# Patient Record
Sex: MALE | Race: WHITE | HISPANIC OR LATINO
[De-identification: names, ages, dates, MRNs, and addresses within clinical notes are randomized per-mention and may not be internally consistent; named-entity substitution may affect disease eponyms.]

---

## 2024-01-03 ENCOUNTER — APPOINTMENT (OUTPATIENT)
Dept: ORTHOPEDIC SURGERY | Facility: CLINIC | Age: 59
End: 2024-01-03
Payer: OTHER MISCELLANEOUS

## 2024-01-03 VITALS — BODY MASS INDEX: 44.1 KG/M2 | HEIGHT: 71 IN | WEIGHT: 315 LBS

## 2024-01-03 DIAGNOSIS — Z78.9 OTHER SPECIFIED HEALTH STATUS: ICD-10-CM

## 2024-01-03 PROBLEM — Z00.00 ENCOUNTER FOR PREVENTIVE HEALTH EXAMINATION: Status: ACTIVE | Noted: 2024-01-03

## 2024-01-03 PROCEDURE — 99203 OFFICE O/P NEW LOW 30 MIN: CPT

## 2024-01-03 PROCEDURE — 73562 X-RAY EXAM OF KNEE 3: CPT | Mod: RT

## 2024-01-03 NOTE — HISTORY OF PRESENT ILLNESS
[de-identified] : Patient is here today for the right knee. Patient was injured at work on 5/12/2023  Patient was moving furniture.  He saw Dr Dupree office had MRI and pre op CSI . he had Right knee scoped 9/2023 and he states at this point the knee is about the same when compared to pre op. post op he has been going to PT and he had CSI which gave him only a few days of relief. he just finished the lubricant injections which helped minimally. he has pain mostly lateral aspect of the knee.  He has not had post op MRI.  he states due to the Right knee pain/severity and difficulty ambulating his Left knee has been painful for the past 6 weeks. He denies any prior knees issues b/l.

## 2024-01-03 NOTE — PHYSICAL EXAM
[de-identified] : Constitutional: The patient appears well developed, well nourished. Examination of patients ability to communicate functionally was normal.       Neurologic: Coordination is normal. Alert and oriented to time, place and person. No evidence of mood disorder, calm affect.       RIGHT      KNEE: Inspection of the knee is as follows: MODERATE effusion. no ecchymosis, no streaking, no erythema, no atrophy, no deformities of the quad tendon and no deformities of patellar tendon.       Palpation of the knee is as follows:LATERAL joint line tenderness and prepatellar TTP . no obvious defects, no palpable masses, no increased warmth and no crepitus.       Knee Range of Motion is as follows in degrees:       Extension: 0    Flexion: 120 with pain     Strength examination of the knee is as follows:       Quadriceps strength is 5/5   Hamstring strength is 5/5       Ligament Stability and Special Test:  positive McMurrays test. ligamentously stable, negative anterior draw, negative Lachman test, negative posterior draw and no varus or valgus instability. patella tracks well and able to do active straight leg raise without an extensor lag.       Neurological examination of the knee is as follows: light touch is intact throughout.       [Right] : right knee [FreeTextEntry9] : ap/lat/skiers show mild medial joint space narrowing and degenerative changes superior patella on lateral view.

## 2024-01-03 NOTE — DISCUSSION/SUMMARY
[de-identified] : Extensive discussion of the options was had with the patient. This discussion included both surgical and nonsurgical options. Options including but not limited to cortisone injection, viscosupplementation,  physical therapy, oral anti-inflammatories, MRI, arthroplasty and arthroscopy were discussed with the patient. We also discussed the option of observation, allowing the patient to continue rest, ice, NSAIDs and following up if the condition does not improve. Time was taken to go over any questions the patient had in regards to any of the treatment plans described.   Discussed importance of patient exploring weight management options.   Patient has tried, PT, CSI, HA inj, HEP amd scope at this time.   Patient reports he had recent blood work and all came back within normal limits.   Patient was given an prescription for a Medrol dose marcus. They were instructed to take the medication as directed and to f/u in 1 month   Patient has not wroked since May, citing that he cannot complete work activities without pain  Entered by Pete Tdod acting as scribe. Dr. Ramirez Attestation The documentation recorded by the scribe, in my presence, accurately reflects the service I, Dr. Ramirez, personally performed, and the decisions made by me with my edits as appropriate.

## 2024-01-29 ENCOUNTER — APPOINTMENT (OUTPATIENT)
Dept: ORTHOPEDIC SURGERY | Facility: CLINIC | Age: 59
End: 2024-01-29
Payer: OTHER MISCELLANEOUS

## 2024-01-29 PROCEDURE — 99213 OFFICE O/P EST LOW 20 MIN: CPT

## 2024-02-01 NOTE — PHYSICAL EXAM
[de-identified] : Constitutional: The patient appears well developed, well nourished. Examination of patients ability to communicate functionally was normal.       Neurologic: Coordination is normal. Alert and oriented to time, place and person. No evidence of mood disorder, calm affect.       RIGHT      KNEE: Inspection of the knee is as follows: MODERATE effusion. no ecchymosis, no streaking, no erythema, no atrophy, no deformities of the quad tendon and no deformities of patellar tendon.       Palpation of the knee is as follows:and prepatellar TTP . no obvious defects, no palpable masses, no increased warmth and no crepitus.       Knee Range of Motion is as follows in degrees:       Extension: 0    Flexion: 120 with pain     Strength examination of the knee is as follows:       Quadriceps strength is 5/5   Hamstring strength is 5/5       Ligament Stability and Special Test:  positive McMurrays test. ligamentously stable, negative anterior draw, negative Lachman test, negative posterior draw and no varus or valgus instability. patella tracks well and able to do active straight leg raise without an extensor lag.       Neurological examination of the knee is as follows: light touch is intact throughout.

## 2024-02-01 NOTE — DISCUSSION/SUMMARY
[de-identified] : Patient now complains of right med pain. He has had steroid and gel injections without relief. He have discussed his disability. He can do light duty but states his employer will not offer him light duty. He states he has tried PT without relief. Options were discussed. Plan is to submit to W/C for MRI authorization to evaluate for medial or lateral meniscus tear.

## 2024-02-01 NOTE — HISTORY OF PRESENT ILLNESS
[de-identified] : following up for the right knee. Patient was prescribed a Medrol pack when last seen 3 weeks ago.  he states the medrol helped a little bit.  He notes pain medial aspect of the knee. He still notes pain when he does squats with PT.  He is working with PT and is unsure if its helping. He needs assistance to get up.

## 2024-02-09 ENCOUNTER — APPOINTMENT (OUTPATIENT)
Dept: ORTHOPEDIC SURGERY | Facility: CLINIC | Age: 59
End: 2024-02-09
Payer: COMMERCIAL

## 2024-02-09 DIAGNOSIS — Z86.59 PERSONAL HISTORY OF OTHER MENTAL AND BEHAVIORAL DISORDERS: ICD-10-CM

## 2024-02-09 PROCEDURE — 73564 X-RAY EXAM KNEE 4 OR MORE: CPT | Mod: LT

## 2024-02-09 PROCEDURE — 99204 OFFICE O/P NEW MOD 45 MIN: CPT

## 2024-02-09 RX ORDER — ESCITALOPRAM OXALATE 20 MG/1
20 TABLET, FILM COATED ORAL
Refills: 0 | Status: ACTIVE | COMMUNITY

## 2024-02-09 RX ORDER — AMLODIPINE BESYLATE 5 MG/1
TABLET ORAL
Refills: 0 | Status: COMPLETED | COMMUNITY
End: 2024-02-09

## 2024-02-09 NOTE — HISTORY OF PRESENT ILLNESS
[de-identified] : Date of initial evaluation is 02/09/2024 Patient age is 58 year Body part causing symptoms is the LT knee  Occupation is out of work due to injury on the RT knee The RT knee being treated by  by Dr Ramirez Symptoms began 2 months ago when he bent down to  a box and felt a pop Location of pain is anterior and lateral Quality of pain is  dull  Pain score at rest is 1/10 Pain score during activity is 7/10 Radicular symptoms are not present Prior treatments include ibuprofen and PT at [facility] Patient's condition is not associated with workers compensation, no-fault or interscholastic athletics

## 2024-02-09 NOTE — IMAGING
[de-identified] : (Exam: Knee)   Laterality is left   Patient is in no acute distress, alert and oriented Sensation is grossly intact to light touch in the foot Motor function is 5/5 in the foot Capillary refill is less than 2 seconds in the toes Lymphadenopathy is not present Peripheral edema is not present   Skin is intact Effusion is not present Atrophy is not present Antalgic gait is not present   Medial joint line tenderness is not present Lateral joint line tenderness is present Patellar tenderness is present Calf tenderness is not present   Knee extension is 0 Knee flexion is 135   Quadriceps strength is 5/5 Hamstring strength is 5/5   Lachman is normal Posterior drawer is normal Varus stress stability is normal Valgus stress stability is normal   Medial Maddison test is normal Lateral Maddison test is abnormal Patellofemoral grind test is abnormal Patellar apprehension test is normal [Left] : left knee [All Views] : anteroposterior, lateral, skyline, and anteroposterior standing [There are no fractures, subluxations or dislocations. No significant abnormalities are seen] : There are no fractures, subluxations or dislocations. No significant abnormalities are seen [Mild patellofemoral OA] : Mild patellofemoral OA

## 2024-02-09 NOTE — DISCUSSION/SUMMARY
[de-identified] : History, clinical examination and imaging were most consistent with: -left knee lateral meniscus tear vs patellofemoral syndrome    The diagnosis was explained in detail. The potential non-surgical and surgical treatments were reviewed. The relative risks and benefits of each option were considered relative to the patients age, activity level, medical history, symptom severity and previously attempted treatments.   The patient was advised to consult with their primary medical provider prior to initiation of any new medications to reduce the risk of adverse effects specific to their long-term home medications and medical history. The risk of gastrointestinal irritation and kidney injury specific to long-term NSAID use was discussed.   -Patient will continue with PT. -Celebrex as needed for pain. -The patient has persistent symptoms despite a trial of non-surgical treatment. An MRI was therefore ordered to evaluate for structural abnormality and further guide treatment recommendations. -Work status as per Dr Ramirez for his right knee.  -Follow up when imaging completed. Will likely consider CSI before surgical options.    (MDM)   Problem Complexity -Moderate: acute, complicated injury   Risk -Moderate: prescription medication

## 2024-02-12 ENCOUNTER — RESULT REVIEW (OUTPATIENT)
Age: 59
End: 2024-02-12

## 2024-02-16 ENCOUNTER — APPOINTMENT (OUTPATIENT)
Dept: ORTHOPEDIC SURGERY | Facility: CLINIC | Age: 59
End: 2024-02-16
Payer: COMMERCIAL

## 2024-02-16 VITALS — WEIGHT: 315 LBS | BODY MASS INDEX: 44.1 KG/M2 | HEIGHT: 71 IN

## 2024-02-16 DIAGNOSIS — M22.2X2 PATELLOFEMORAL DISORDERS, LEFT KNEE: ICD-10-CM

## 2024-02-16 PROCEDURE — 99214 OFFICE O/P EST MOD 30 MIN: CPT | Mod: 25

## 2024-02-16 PROCEDURE — 20610 DRAIN/INJ JOINT/BURSA W/O US: CPT | Mod: LT

## 2024-02-16 NOTE — DATA REVIEWED
[MRI] : MRI [Left] : left [Knee] : knee [I independently reviewed and interpreted images and report] : I independently reviewed and interpreted images and report [FreeTextEntry1] : posterior horn flap tear medial meniscus, root intact, lateral meniscus intact, severe PF OA of the medial facet

## 2024-02-16 NOTE — IMAGING
[Left] : left knee [All Views] : anteroposterior, lateral, skyline, and anteroposterior standing [There are no fractures, subluxations or dislocations. No significant abnormalities are seen] : There are no fractures, subluxations or dislocations. No significant abnormalities are seen [Mild patellofemoral OA] : Mild patellofemoral OA [de-identified] : (Exam: Knee)   Laterality is left   Patient is in no acute distress, alert and oriented Sensation is grossly intact to light touch in the foot Motor function is 5/5 in the foot Capillary refill is less than 2 seconds in the toes Lymphadenopathy is not present Peripheral edema is not present   Skin is intact Effusion is not present Atrophy is not present Antalgic gait is not present   Medial joint line tenderness is present Lateral joint line tenderness is not present Patellar tenderness is present Calf tenderness is not present   Knee extension is 0 Knee flexion is 135   Quadriceps strength is 5/5 Hamstring strength is 5/5   Lachman is normal Posterior drawer is normal Varus stress stability is normal Valgus stress stability is normal   Medial Maddison test is abnormal Lateral Maddison test is normal Patellofemoral grind test is abnormal Patellar apprehension test is normal

## 2024-02-16 NOTE — HISTORY OF PRESENT ILLNESS
[de-identified] : 02/16/2024 LT knee MRI review. pain is mostly anterior worse with stairs  IMPRESSION: * Medial meniscal tear with flipped meniscal fragment. * Osteoarthritis as described. * Small knee joint effusion.   Date of initial evaluation is 02/09/2024 Patient age is 58 year Body part causing symptoms is the LT knee  Occupation is out of work due to injury on the RT knee The RT knee being treated by  by Dr Ramirez Symptoms began 2 months ago when he bent down to  a box and felt a pop Location of pain is anterior and lateral Quality of pain is  dull  Pain score at rest is 1/10 Pain score during activity is 7/10 Radicular symptoms are not present Prior treatments include ibuprofen and PT at [facility] Patient's condition is not associated with workers compensation, no-fault or interscholastic athletics

## 2024-02-16 NOTE — DISCUSSION/SUMMARY
[de-identified] : History, clinical examination and imaging were most consistent with: -left knee medial meniscus tear and patellofemoral osteoarthritis    The diagnosis was explained in detail. The potential non-surgical and surgical treatments were reviewed. The relative risks and benefits of each option were considered relative to the patients age, activity level, medical history, symptom severity and previously attempted treatments.   The patient was advised to consult with their primary medical provider prior to initiation of any new medications to reduce the risk of adverse effects specific to their long-term home medications and medical history. The risk of gastrointestinal irritation and kidney injury specific to long-term NSAID use was discussed.   -Discussed arthroscopic meniscectomy versus a trial of non-surgical treatment. Discussed meniscectomy would not alleviate pain from his patellofemoral joint. -Patient wishes to attempt a trial of non-surgical treatment. -Patient will continue with PT. -Cortisone injection performed for symptom relief.  -Celebrex as needed for pain. -Work status as per Dr Ramirez for his right knee.  -Follow up in 6 weeks, if symptoms persist will discuss meniscectomy.    (MDM)   Problem Complexity -Moderate: acute, complicated injury   Risk -Moderate: injection  (Procedure: Corticosteroid Injection)   Injection location was the: -left knee joint   Injection contents were: 40 mg of kenalog and 5 mL of 1% lidocaine   Procedure Details: -Informed consent was obtained. Discussed possible risks of corticosteroid injection including hyperglycemia, infection and skin discoloration. -Discussed that due to infection risk, an interval between injection and any future surgery is 6 weeks for arthroscopy and 3 months for arthroplasty. -Injection performed using aseptic technique. Hemostasis was confirmed. -Procedure was tolerated well with no complications.

## 2024-03-04 ENCOUNTER — APPOINTMENT (OUTPATIENT)
Dept: ORTHOPEDIC SURGERY | Facility: CLINIC | Age: 59
End: 2024-03-04
Payer: COMMERCIAL

## 2024-03-04 PROCEDURE — 99214 OFFICE O/P EST MOD 30 MIN: CPT

## 2024-03-04 NOTE — IMAGING
[Left] : left knee [There are no fractures, subluxations or dislocations. No significant abnormalities are seen] : There are no fractures, subluxations or dislocations. No significant abnormalities are seen [All Views] : anteroposterior, lateral, skyline, and anteroposterior standing [Mild patellofemoral OA] : Mild patellofemoral OA [de-identified] : (Exam: Knee)   Laterality is left   Patient is in no acute distress, alert and oriented Sensation is grossly intact to light touch in the foot Motor function is 5/5 in the foot Capillary refill is less than 2 seconds in the toes Lymphadenopathy is not present Peripheral edema is not present   Skin is intact Effusion is not present Atrophy is not present Antalgic gait is not present   Medial joint line tenderness is present Lateral joint line tenderness is not present Patellar tenderness is present Calf tenderness is not present   Knee extension is 0 Knee flexion is 135   Quadriceps strength is 5/5 Hamstring strength is 5/5   Lachman is normal Posterior drawer is normal Varus stress stability is normal Valgus stress stability is normal   Medial Maddison test is abnormal Lateral Maddison test is normal Patellofemoral grind test is abnormal Patellar apprehension test is normal

## 2024-03-04 NOTE — HISTORY OF PRESENT ILLNESS
[de-identified] : 03/04/2024: f/u for left knee. CSI on 02/16/2024 with relief for 3 days. Stopped PT due to pain, has been trying to do home exercises. Stopped taking Celebrex due to no relief. Feels worse than last visit. has pain and swelling.   02/16/2024 LT knee MRI review. pain is mostly anterior worse with stairs  IMPRESSION: * Medial meniscal tear with flipped meniscal fragment. * Osteoarthritis as described. * Small knee joint effusion.   Date of initial evaluation is 02/09/2024 Patient age is 58 year Body part causing symptoms is the LT knee  Occupation is out of work due to injury on the RT knee The RT knee being treated by  by Dr Ramirez Symptoms began 2 months ago when he bent down to  a box and felt a pop Location of pain is anterior and lateral Quality of pain is  dull  Pain score at rest is 1/10 Pain score during activity is 7/10 Radicular symptoms are not present Prior treatments include ibuprofen and PT at [facility] Patient's condition is not associated with workers compensation, no-fault or interscholastic athletics

## 2024-03-04 NOTE — DISCUSSION/SUMMARY
[de-identified] : History, clinical examination and imaging were most consistent with: -left knee medial meniscus tear and patellofemoral osteoarthritis    The diagnosis was explained in detail. The potential non-surgical and surgical treatments were reviewed. The relative risks and benefits of each option were considered relative to the patients age, activity level, medical history, symptom severity and previously attempted treatments.   -Surgical treatment was selected. The patient failed to improve with non-surgical treatment. Their symptoms have placed a significant burden on their quality of life. The risks, benefits and alternatives of the planned surgical procedure were discussed, along with the expected timeline for rehabilitation and specifics regarding the most common complications. The patient agreed to participate in post-operative physical therapy. -Work status as per Dr Ramirez for his right knee.  -Discussed that arthroscopic surgery may not predictably improve pain from his patellofemoral degeneration. -Patient provided medrol dose pack for interim symptom relief.   -Procedure: left knee arthroscopy, medial meniscectomy -Clearances: standard medical -Diabetic: no -Smoker: no -Follow up: 1-2 weeks after surgery   (MDM)   Problem Complexity -Moderate: acute, complicated injury   Risk -Moderate: pre-surgical discussion

## 2024-03-13 ENCOUNTER — NON-APPOINTMENT (OUTPATIENT)
Age: 59
End: 2024-03-13

## 2024-03-29 ENCOUNTER — APPOINTMENT (OUTPATIENT)
Dept: ORTHOPEDIC SURGERY | Facility: CLINIC | Age: 59
End: 2024-03-29
Payer: COMMERCIAL

## 2024-03-29 DIAGNOSIS — M17.12 UNILATERAL PRIMARY OSTEOARTHRITIS, LEFT KNEE: ICD-10-CM

## 2024-03-29 DIAGNOSIS — S83.242A OTHER TEAR OF MEDIAL MENISCUS, CURRENT INJURY, LEFT KNEE, INITIAL ENCOUNTER: ICD-10-CM

## 2024-03-29 PROCEDURE — 99214 OFFICE O/P EST MOD 30 MIN: CPT

## 2024-03-29 NOTE — DISCUSSION/SUMMARY
[de-identified] : History, clinical examination and imaging were most consistent with: -left knee medial meniscus tear and patellofemoral osteoarthritis    The diagnosis was explained in detail. The potential non-surgical and surgical treatments were reviewed. The relative risks and benefits of each option were considered relative to the patients age, activity level, medical history, symptom severity and previously attempted treatments.   -Patient will proceed with surgery as scheduled with left knee medial meniscectomy.  -Work status as per Dr Ramirez for his right knee.  -Discussed that arthroscopic surgery may not predictably improve pain from his patellofemoral degeneration. -Follow up post-operatively.    (MDM)   Problem Complexity -Moderate: acute, complicated injury   Risk -Moderate: pre-surgical discussion

## 2024-03-29 NOTE — IMAGING
[Left] : left knee [All Views] : anteroposterior, lateral, skyline, and anteroposterior standing [There are no fractures, subluxations or dislocations. No significant abnormalities are seen] : There are no fractures, subluxations or dislocations. No significant abnormalities are seen [Mild patellofemoral OA] : Mild patellofemoral OA [de-identified] : (Exam: Knee)   Laterality is left   Patient is in no acute distress, alert and oriented Sensation is grossly intact to light touch in the foot Motor function is 5/5 in the foot Capillary refill is less than 2 seconds in the toes Lymphadenopathy is not present Peripheral edema is not present   Skin is intact Effusion is not present Atrophy is not present Antalgic gait is not present   Medial joint line tenderness is present Lateral joint line tenderness is not present Patellar tenderness is present Calf tenderness is not present   Knee extension is 0 Knee flexion is 135   Quadriceps strength is 5/5 Hamstring strength is 5/5   Lachman is normal Posterior drawer is normal Varus stress stability is normal Valgus stress stability is normal   Medial Maddison test is abnormal Lateral Maddison test is normal Patellofemoral grind test is abnormal Patellar apprehension test is normal

## 2024-04-02 RX ORDER — ASPIRIN 81 MG/1
81 TABLET, COATED ORAL
Qty: 60 | Refills: 0 | Status: ACTIVE | COMMUNITY
Start: 2024-04-02 | End: 1900-01-01

## 2024-04-03 ENCOUNTER — APPOINTMENT (OUTPATIENT)
Dept: ORTHOPEDIC SURGERY | Facility: HOSPITAL | Age: 59
End: 2024-04-03
Payer: COMMERCIAL

## 2024-04-03 PROCEDURE — 29881 ARTHRS KNE SRG MNISECTMY M/L: CPT | Mod: LT

## 2024-04-19 ENCOUNTER — APPOINTMENT (OUTPATIENT)
Dept: ORTHOPEDIC SURGERY | Facility: CLINIC | Age: 59
End: 2024-04-19
Payer: COMMERCIAL

## 2024-04-19 PROCEDURE — 99024 POSTOP FOLLOW-UP VISIT: CPT

## 2024-05-01 ENCOUNTER — APPOINTMENT (OUTPATIENT)
Dept: ORTHOPEDIC SURGERY | Facility: CLINIC | Age: 59
End: 2024-05-01

## 2024-05-17 ENCOUNTER — APPOINTMENT (OUTPATIENT)
Dept: ORTHOPEDIC SURGERY | Facility: CLINIC | Age: 59
End: 2024-05-17
Payer: COMMERCIAL

## 2024-05-17 DIAGNOSIS — Z47.89 ENCOUNTER FOR OTHER ORTHOPEDIC AFTERCARE: ICD-10-CM

## 2024-05-17 PROCEDURE — 99024 POSTOP FOLLOW-UP VISIT: CPT

## 2024-05-17 NOTE — DISCUSSION/SUMMARY
[de-identified] : Patient is making progress with function and strength.  -Continue with PT and transition to HEP. -Return to activity as symptoms permit.  -Ibuprofen and acetaminophen as needed for pain. -Follow up as needed.

## 2024-05-17 NOTE — HISTORY OF PRESENT ILLNESS
[de-identified] : 05/17/2024: f/u for left knee. PT 2x a week with improvement. still has clicking and pain when moving from sitting to standing. ibuprofen PRN. reports worsening back pain and has upcoming visit with a specialist.   Eval is 4/19 Date of surgery was 04/03/24  Surgery performed was LT knee medial meniscectomy Current pain score is 5/10 Current medications taken are Motrin, Ibuprofen  Physical therapy is not started yet

## 2024-05-17 NOTE — IMAGING
[de-identified] : Laterality is left   Patient is in no acute distress, alert and oriented Sensation is grossly intact to light touch in the foot Motor function is 5/5 in the foot Capillary refill is less than 2 seconds in the toes Lymphadenopathy is not present Peripheral edema is not present   Skin is intact Incisions are healing well Effusion is trace Quadriceps atrophy is present Calf tenderness is not present   Knee extension is 0 Knee flexion is 135   Quadriceps strength is 5-/5 Hamstring strength is 5/5   Lachman is normal Posterior drawer is normal Varus stress stability is normal Valgus stress stability is normal

## 2024-05-22 ENCOUNTER — APPOINTMENT (OUTPATIENT)
Dept: ORTHOPEDIC SURGERY | Facility: CLINIC | Age: 59
End: 2024-05-22
Payer: OTHER MISCELLANEOUS

## 2024-05-22 VITALS — WEIGHT: 314 LBS | HEIGHT: 71 IN | BODY MASS INDEX: 43.96 KG/M2

## 2024-05-22 DIAGNOSIS — M17.11 UNILATERAL PRIMARY OSTEOARTHRITIS, RIGHT KNEE: ICD-10-CM

## 2024-05-22 DIAGNOSIS — M23.91 UNSPECIFIED INTERNAL DERANGEMENT OF RIGHT KNEE: ICD-10-CM

## 2024-05-22 PROCEDURE — 99214 OFFICE O/P EST MOD 30 MIN: CPT

## 2024-05-22 NOTE — PHYSICAL EXAM
[de-identified] : Constitutional: The patient appears well developed, well nourished. Examination of patients ability to communicate functionally was normal.       Neurologic: Coordination is normal. Alert and oriented to time, place and person. No evidence of mood disorder, calm affect.       RIGHT      KNEE: Inspection of the knee is as follows: MODERATE effusion. no ecchymosis, no streaking, no erythema, no atrophy, no deformities of the quad tendon and no deformities of patellar tendon.       Palpation of the knee is as follows:and prepatellar TTP . no obvious defects, no palpable masses, no increased warmth and no crepitus.       Knee Range of Motion is as follows in degrees:       Extension: 0    Flexion: 120 with pain     Strength examination of the knee is as follows:       Quadriceps strength is 5/5   Hamstring strength is 5/5       Ligament Stability and Special Test:  positive McMurrays test. ligamentously stable, negative anterior draw, negative Lachman test, negative posterior draw and no varus or valgus instability. patella tracks well and able to do active straight leg raise without an extensor lag.       Neurological examination of the knee is as follows: light touch is intact throughout.

## 2024-05-22 NOTE — DISCUSSION/SUMMARY
[de-identified] : Extensive discussion of the options was had with the patient. This discussion included both surgical and nonsurgical options. Options including but not limited to cortisone injection, visco-supplementation, physical therapy, oral anti-inflammatories, MRI, arthroplasty VS arthroscopy were discussed with the patient. We also discussed the option of observation, allowing the patient to continue rest, ice, prescription drug NSAIDs and following up if the condition does not improve. Time was taken to go over any questions the patient had in regard to any of the treatment plans described. I have personally reviewed all previous images as well as imaging reports and the information was thoroughly discussed and reviewed with the patient as well. All questions have been answered and the patient is in agreement with the plan proceeding. The Risks, benefits, alternatives and expectations of the proposed procedure, as well as non-operative management, were discussed at length with the patient, as well as the procedure itself and the expected recovery period. The possible need for post operative Physical Therapy was also discussed. The patient was allowed as much time as necessary to ask all appropriate questions and they were answered to the patient's satisfaction.  The plan at this time is to go forward with RIGHT KNEE MEDIAL MENISCECTOMY  Entered by Pete Todd acting as scribe. Dr. Ramirez Attestation The documentation recorded by the scribe, in my presence, accurately reflects the service I, Dr. Ramirez, personally performed, and the decisions made by me with my edits as appropriate.

## 2024-05-22 NOTE — HISTORY OF PRESENT ILLNESS
[de-identified] : Following up on right knee. Patient is here to review MRI results.  Patient admits he still notes pain medial aspect of the knee but it has improved.

## 2024-06-05 ENCOUNTER — APPOINTMENT (OUTPATIENT)
Dept: ORTHOPEDIC SURGERY | Facility: CLINIC | Age: 59
End: 2024-06-05
Payer: COMMERCIAL

## 2024-06-05 DIAGNOSIS — M48.061 SPINAL STENOSIS, LUMBAR REGION WITHOUT NEUROGENIC CLAUDICATION: ICD-10-CM

## 2024-06-05 DIAGNOSIS — M51.36 OTHER INTERVERTEBRAL DISC DEGENERATION, LUMBAR REGION: ICD-10-CM

## 2024-06-05 DIAGNOSIS — M43.16 SPONDYLOLISTHESIS, LUMBAR REGION: ICD-10-CM

## 2024-06-05 DIAGNOSIS — M51.37 OTHER INTERVERTEBRAL DISC DEGENERATION, LUMBOSACRAL REGION: ICD-10-CM

## 2024-06-05 DIAGNOSIS — M47.817 SPONDYLOSIS W/OUT MYELOPATHY OR RADICULOPATHY, LUMBOSACRAL REGION: ICD-10-CM

## 2024-06-05 PROCEDURE — 99214 OFFICE O/P EST MOD 30 MIN: CPT | Mod: 24

## 2024-06-05 PROCEDURE — 72100 X-RAY EXAM L-S SPINE 2/3 VWS: CPT

## 2024-06-05 RX ORDER — METHYLPREDNISOLONE 4 MG/1
4 TABLET ORAL
Qty: 1 | Refills: 0 | Status: ACTIVE | COMMUNITY
Start: 2024-06-05 | End: 1900-01-01

## 2024-06-05 RX ORDER — OXYCODONE 5 MG/1
5 TABLET ORAL EVERY 6 HOURS
Qty: 20 | Refills: 0 | Status: DISCONTINUED | COMMUNITY
Start: 2024-04-02 | End: 2024-06-05

## 2024-06-05 RX ORDER — POLYETHYLENE GLYCOL 3350 17 G/17G
17 POWDER, FOR SOLUTION ORAL DAILY
Qty: 7 | Refills: 0 | Status: DISCONTINUED | COMMUNITY
Start: 2024-04-02 | End: 2024-06-05

## 2024-06-05 RX ORDER — CELECOXIB 200 MG/1
200 CAPSULE ORAL
Qty: 60 | Refills: 2 | Status: DISCONTINUED | COMMUNITY
Start: 2024-02-09 | End: 2024-06-05

## 2024-06-05 RX ORDER — FAMOTIDINE 20 MG/1
20 TABLET, FILM COATED ORAL
Qty: 14 | Refills: 0 | Status: DISCONTINUED | COMMUNITY
Start: 2024-04-02 | End: 2024-06-05

## 2024-06-05 RX ORDER — METHOCARBAMOL 750 MG/1
750 TABLET, FILM COATED ORAL
Qty: 60 | Refills: 0 | Status: ACTIVE | COMMUNITY
Start: 2024-06-05 | End: 1900-01-01

## 2024-06-05 RX ORDER — METHYLPREDNISOLONE 4 MG/1
4 TABLET ORAL
Qty: 1 | Refills: 0 | Status: DISCONTINUED | COMMUNITY
Start: 2024-03-04 | End: 2024-06-05

## 2024-06-05 RX ORDER — MELOXICAM 15 MG/1
15 TABLET ORAL DAILY
Qty: 30 | Refills: 2 | Status: DISCONTINUED | COMMUNITY
Start: 2024-04-02 | End: 2024-06-05

## 2024-06-05 RX ORDER — METHYLPREDNISOLONE 4 MG/1
4 TABLET ORAL
Qty: 1 | Refills: 0 | Status: DISCONTINUED | COMMUNITY
Start: 2024-01-03 | End: 2024-06-05

## 2024-06-05 NOTE — PHYSICAL EXAM
[de-identified] : Constitutional: Well groomed and developed.  Respiratory: Normal, unlabored breathing. No use of accessory muscles.  Skin: No rashes or ulcers. Skin warm and dry.  Psychiatric: Oriented to time, place, person and event. No acute distress. Gait: Heel to toe Patient able to walk on toes and heels.    Lumbar spine:  Posture: Normal on coronal and sagittal alignment  AROM:  Flexion 60  Extension 5 Moderate pain with simulated truncal motion   Tenderness:  Thoracic: No tenderness on palpation  Lumbar: Moderate tenderness on palpation  Sacrum/coccyx: no tenderness on palpation  Greater trochanteric bursa:  No tenderness  PSIS: None    Motor:                         R             L LE:                                IS                    5             5 Quad              5             5 TA                  5             5 EHL                5             5 Gastroc         5             5                 R  L DTR: Patella  2+  2+ Achilles  2+  2+  Sensory: Light touch sensation decreased right L4 Babinski's Sign: Negative bilaterally  Straight leg raise test: Positive bilaterally  REGGIE test: negative bilaterally

## 2024-06-05 NOTE — HISTORY OF PRESENT ILLNESS
[Lower back] : lower back [Gradual] : gradual [9] : 9 [6] : 6 [Constant] : constant [de-identified] : Patient presents today with lower back pain ongoing for years- worse ~12/2023 after having meniscus sx in bilat knees.  Pt reports constant pain in middle and left side of lower back Reports intermittent pain radiating into leg- denies numbness or tingling in LEs Reports pain is worse when laying down and with prolonged walking  Reports using ice and heat with some relief  Denies taking meds for pain  Pt reports going to [facility] in [place] 2x/week with some relief.  Pt denies imaging of the back.  [] : no [de-identified] : not working

## 2024-06-05 NOTE — ASSESSMENT
[FreeTextEntry1] : 60 yo male presents today for eval of his low back. XR shows DDD L4-S1 and spondy L3-4. Options discussed. I recommend proceeding with PT and medication management for relief. May consider MRI followed by AL[facility]HA in future if no relief.   - Recommend physical therapy to regain range of motion, strengthening and symptomatic improvement. Prescription given in office today.   - Patient will begin Medrol.  Patient advised not to take any NSAIDs while taking the steroids.   - Patient given prescription for Robaxin 750mg today. Advised patient that medication may make them drowsy, start by taking it at night.   - Recommend NSAIDs PRN - Recommend heating pad use to decrease muscle spasm - Discussed the importance of home exercises, including but not limited to hamstring stretching and core strengthening   Patient was educated on their diagnosis today. All questions answered and patient expressed understanding.  Follow up in 2 months

## 2024-07-01 ENCOUNTER — NON-APPOINTMENT (OUTPATIENT)
Age: 59
End: 2024-07-01

## 2024-08-01 RX ORDER — HYDROCODONE BITARTRATE AND ACETAMINOPHEN 5; 325 MG/1; MG/1
5-325 TABLET ORAL
Qty: 30 | Refills: 0 | Status: ACTIVE | COMMUNITY
Start: 2024-08-01 | End: 1900-01-01

## 2024-08-02 ENCOUNTER — APPOINTMENT (OUTPATIENT)
Dept: ORTHOPEDIC SURGERY | Facility: AMBULATORY SURGERY CENTER | Age: 59
End: 2024-08-02

## 2024-08-02 PROCEDURE — 29881 ARTHRS KNE SRG MNISECTMY M/L: CPT | Mod: RT

## 2024-08-02 PROCEDURE — 29881 ARTHRS KNE SRG MNISECTMY M/L: CPT | Mod: AS,RT

## 2024-08-12 ENCOUNTER — APPOINTMENT (OUTPATIENT)
Dept: ORTHOPEDIC SURGERY | Facility: CLINIC | Age: 59
End: 2024-08-12
Payer: OTHER MISCELLANEOUS

## 2024-08-12 DIAGNOSIS — M17.11 UNILATERAL PRIMARY OSTEOARTHRITIS, RIGHT KNEE: ICD-10-CM

## 2024-08-12 PROCEDURE — 99024 POSTOP FOLLOW-UP VISIT: CPT

## 2024-08-12 NOTE — REASON FOR VISIT
[FreeTextEntry2] : Patient here s/p right knee scope partial lateral meniscec, PF joint chondroplasty on 08/02/24.

## 2024-08-12 NOTE — PHYSICAL EXAM
[de-identified] : Constitutional: The patient appears well developed, well nourished.       Neurologic: Coordination is normal. Alert and oriented to time, place and person. No evidence of mood disorder, calm affect.        RIGHT     KNEE: Inspection of the knee is as follows: moderate effusion and small ecchymosis. no streaking, no erythema, no atrophy, no deformities of the quad tendon and no deformities of patellar tendon.       Inspection of the wound reveals clean and dry incision, no fluctuance, no sign of infection, no erythema and no drainage. Mesh/Sutures were removed.      Palpation of the knee is as follows: no increased warmth.      Knee Range of Motion is as follows in degrees:        Extension: 5 soft end point   Flexion: 110       Strength examination of the knee is as follows:    Quadriceps strength is 5/5    Hamstring strength is 5/5       Ligament Stability and Special Test ligamentously stable and no varus or valgus instability. patella tracks well and able to do active straight leg raise without an extensor lag.       Neurological examination of the knee is as follows: light touch is intact throughout.       Gait and function is as follows: mildly antalgic gait.

## 2024-08-12 NOTE — HISTORY OF PRESENT ILLNESS
Refill requested by: [facility]     Cardiology Provider: Casandra Escobedo NP  Med: Metoprolol   Dosage: 50mg   Sig: daily  Dose/Sig verified by LOV Note:Yes  Allergies: ALLERGIES:  No Known Allergies    Quantity requestin day supply  LOV: 23  NOV: 10/18/23  Required Labs: No labs required     EKG NEEDED: No    CHEST XRAY NEEDED: No    Comments:   Medication refilled per protocol        [de-identified] : Patient here s/p right knee scope partial lateral meniscec, PF joint chondroplasty on 08/02/24. Patient is here for suture removal. Patient reports his right knee pain has improved prior to surgery. Patient reports he is taking Tylenol PRN for pain.  Patient denies any fever chills or drainage from the knee.

## 2024-08-12 NOTE — DISCUSSION/SUMMARY
[de-identified] : Patient will work on ROM.  At this time. discussed patient going to PT to work on ROM and strength, the patient declined PT.  He will f/u in 4-6 weeks.  WBAT

## 2024-08-19 ENCOUNTER — APPOINTMENT (OUTPATIENT)
Dept: ORTHOPEDIC SURGERY | Facility: CLINIC | Age: 59
End: 2024-08-19

## 2024-08-19 DIAGNOSIS — Z47.89 ENCOUNTER FOR OTHER ORTHOPEDIC AFTERCARE: ICD-10-CM

## 2024-08-19 DIAGNOSIS — M17.12 UNILATERAL PRIMARY OSTEOARTHRITIS, LEFT KNEE: ICD-10-CM

## 2024-08-19 PROCEDURE — 99213 OFFICE O/P EST LOW 20 MIN: CPT

## 2024-08-19 NOTE — DISCUSSION/SUMMARY
[de-identified] : Patient has worsening left knee pain likely secondary to exacerbation of his underlying osteoarthritis due to compensation for his recent right knee surgery.  Continue with HEP. Cortisone injection is deferred at this time. Continue NSAID as needed.  Follow up in 6 weeks with updated XR, consider CSI if symptoms persist.

## 2024-08-19 NOTE — HISTORY OF PRESENT ILLNESS
[de-identified] : 08/19/2024: f/u for left knee. PT 2x a week. he recently had revision arthroscopy on his RT knee with Dr. Ramirez. left knee pain is gradually worsnening. ibuprofen PRN..  05/17/2024: f/u for left knee. PT 2x a week with improvement. still has clicking and pain when moving from sitting to standing. ibuprofen PRN. reports worsening back pain and has upcoming visit with a specialist.   Eval is 4/19 Date of surgery was 04/03/24  Surgery performed was LT knee medial meniscectomy Current pain score is 5/10 Current medications taken are Motrin, Ibuprofen  Physical therapy is not started yet

## 2024-08-19 NOTE — IMAGING
[de-identified] : Laterality is left   Patient is in no acute distress, alert and oriented Sensation is grossly intact to light touch in the foot Motor function is 5/5 in the foot Capillary refill is less than 2 seconds in the toes Lymphadenopathy is not present Peripheral edema is not present   Skin is intact Incisions are healing well Effusion is trace Quadriceps atrophy is present Calf tenderness is not present   Medial joint line tenderness is present  Knee extension is 0 Knee flexion is 135   Quadriceps strength is 5-/5 Hamstring strength is 5/5   Lachman is normal Posterior drawer is normal Varus stress stability is normal Valgus stress stability is normal Medial Maddison is negative Patellofemoral grind is positive

## 2024-08-21 ENCOUNTER — APPOINTMENT (OUTPATIENT)
Dept: ORTHOPEDIC SURGERY | Facility: CLINIC | Age: 59
End: 2024-08-21

## 2024-08-21 DIAGNOSIS — M48.061 SPINAL STENOSIS, LUMBAR REGION WITHOUT NEUROGENIC CLAUDICATION: ICD-10-CM

## 2024-08-21 DIAGNOSIS — M43.16 SPONDYLOLISTHESIS, LUMBAR REGION: ICD-10-CM

## 2024-08-21 DIAGNOSIS — M51.36 OTHER INTERVERTEBRAL DISC DEGENERATION, LUMBAR REGION: ICD-10-CM

## 2024-08-21 DIAGNOSIS — M47.817 SPONDYLOSIS W/OUT MYELOPATHY OR RADICULOPATHY, LUMBOSACRAL REGION: ICD-10-CM

## 2024-08-21 DIAGNOSIS — M51.37 OTHER INTERVERTEBRAL DISC DEGENERATION, LUMBOSACRAL REGION: ICD-10-CM

## 2024-08-21 PROCEDURE — 99214 OFFICE O/P EST MOD 30 MIN: CPT

## 2024-08-21 NOTE — HISTORY OF PRESENT ILLNESS
[de-identified] : Body part: lower back Better/ Worse/ Same since last visit: same Treatments since last visit: hasn't been to PT due to recent knee surgery 8/2/24 Difficulty with: lying flat on his back Radicular symptoms: none Paresthesia: none Current medications for pain: Finished Medrol pack with no relief, Robaxin PRN with minimal relief   Today's Pain:  4/10

## 2024-08-21 NOTE — PHYSICAL EXAM
[de-identified] : Constitutional: Well groomed and developed.  Respiratory: Normal, unlabored breathing. No use of accessory muscles.  Skin: No rashes or ulcers. Skin warm and dry.  Psychiatric: Oriented to time, place, person and event. No acute distress. Gait: Heel to toe Patient able to walk on toes and heels.    Lumbar spine:  Posture: Normal on coronal and sagittal alignment  AROM:  Flexion 60  Extension 5 Moderate pain with simulated truncal motion   Tenderness:  Thoracic: No tenderness on palpation  Lumbar: Moderate tenderness on palpation  Sacrum/coccyx: no tenderness on palpation  Greater trochanteric bursa:  No tenderness  PSIS: None    Motor:                         R             L LE:                                IS                    5             5 Quad              5             5 TA                  5             5 EHL                5             5 Gastroc         5             5                 R  L DTR: Patella  2+  2+ Achilles  2+  2+  Sensory: Light touch sensation decreased right L4 Babinski's Sign: Negative bilaterally  Straight leg raise test: Positive bilaterally  REGGIE test: negative bilaterally

## 2024-08-21 NOTE — PHYSICAL EXAM
[de-identified] : Constitutional: Well groomed and developed.  Respiratory: Normal, unlabored breathing. No use of accessory muscles.  Skin: No rashes or ulcers. Skin warm and dry.  Psychiatric: Oriented to time, place, person and event. No acute distress. Gait: Heel to toe Patient able to walk on toes and heels.    Lumbar spine:  Posture: Normal on coronal and sagittal alignment  AROM:  Flexion 60  Extension 5 Moderate pain with simulated truncal motion   Tenderness:  Thoracic: No tenderness on palpation  Lumbar: Moderate tenderness on palpation  Sacrum/coccyx: no tenderness on palpation  Greater trochanteric bursa:  No tenderness  PSIS: None    Motor:                         R             L LE:                                IS                    5             5 Quad              5             5 TA                  5             5 EHL                5             5 Gastroc         5             5                 R  L DTR: Patella  2+  2+ Achilles  2+  2+  Sensory: Light touch sensation decreased right L4 Babinski's Sign: Negative bilaterally  Straight leg raise test: Positive bilaterally  REGGIE test: negative bilaterally

## 2024-08-21 NOTE — ASSESSMENT
[FreeTextEntry1] : 58 yo male presents today for eval of his low back. XR shows DDD L4-S1 and spondy L3-4. Patient has been attending PT in June and July but stopped due to recent knee surgery on 8/2 with Dr. Ramirez. However, he has performed an HEP as detailed below since his last visit on 6/2024 with some temporary relief. They have also undergone medication management including but not limited to Medrol and Robaxin with temporary relief. Due to persistent pain, likely need for injection, and radicular symptoms, recommend an MRI to further evaluate for nerve involvement and degree of pathology.   - Patient given prescription for MRI, follow up after study is completed to discuss results.  Patient has been doing a home exercise plan based on exercises given on their last office visit.  These exercises include calf stretching, hamstring stretching, hip flexor stretching, hip rotator stretch, quad stretching, curl ups, bridges, prone press up, knee lift leg reach and wall slide.  Patient has been doing these exercises for over 6 weeks, roughly 4 times a week for 30 minutes daily.  Patient is still experiencing low back pain with radiculopathy.   - Recommend physical therapy to regain range of motion, strengthening and symptomatic improvement. Prescription given in office today.   - Recommend NSAIDs PRN - Recommend heating pad use to decrease muscle spasm - Discussed the importance of home exercises, including but not limited to hamstring stretching and core strengthening   Patient was educated on their diagnosis today. All questions answered and patient expressed understanding.  F/U after MRI

## 2024-08-21 NOTE — HISTORY OF PRESENT ILLNESS
[de-identified] : Body part: lower back Better/ Worse/ Same since last visit: same Treatments since last visit: hasn't been to PT due to recent knee surgery 8/2/24 Difficulty with: lying flat on his back Radicular symptoms: none Paresthesia: none Current medications for pain: Finished Medrol pack with no relief, Robaxin PRN with minimal relief   Today's Pain:  4/10

## 2024-08-21 NOTE — ASSESSMENT
[FreeTextEntry1] : 60 yo male presents today for eval of his low back. XR shows DDD L4-S1 and spondy L3-4. Patient has been attending PT in June and July but stopped due to recent knee surgery on 8/2 with Dr. Ramirez. However, he has performed an HEP as detailed below since his last visit on 6/2024 with some temporary relief. They have also undergone medication management including but not limited to Medrol and Robaxin with temporary relief. Due to persistent pain, likely need for injection, and radicular symptoms, recommend an MRI to further evaluate for nerve involvement and degree of pathology.   - Patient given prescription for MRI, follow up after study is completed to discuss results.  Patient has been doing a home exercise plan based on exercises given on their last office visit.  These exercises include calf stretching, hamstring stretching, hip flexor stretching, hip rotator stretch, quad stretching, curl ups, bridges, prone press up, knee lift leg reach and wall slide.  Patient has been doing these exercises for over 6 weeks, roughly 4 times a week for 30 minutes daily.  Patient is still experiencing low back pain with radiculopathy.   - Recommend physical therapy to regain range of motion, strengthening and symptomatic improvement. Prescription given in office today.   - Recommend NSAIDs PRN - Recommend heating pad use to decrease muscle spasm - Discussed the importance of home exercises, including but not limited to hamstring stretching and core strengthening   Patient was educated on their diagnosis today. All questions answered and patient expressed understanding.  F/U after MRI

## 2024-08-30 ENCOUNTER — RESULT REVIEW (OUTPATIENT)
Age: 59
End: 2024-08-30

## 2024-09-09 ENCOUNTER — APPOINTMENT (OUTPATIENT)
Dept: ORTHOPEDIC SURGERY | Facility: CLINIC | Age: 59
End: 2024-09-09
Payer: OTHER MISCELLANEOUS

## 2024-09-09 VITALS — HEIGHT: 71 IN | BODY MASS INDEX: 44.1 KG/M2 | WEIGHT: 315 LBS

## 2024-09-09 DIAGNOSIS — Z98.890 OTHER SPECIFIED POSTPROCEDURAL STATES: ICD-10-CM

## 2024-09-09 DIAGNOSIS — M17.11 UNILATERAL PRIMARY OSTEOARTHRITIS, RIGHT KNEE: ICD-10-CM

## 2024-09-09 PROCEDURE — 99024 POSTOP FOLLOW-UP VISIT: CPT

## 2024-09-09 NOTE — DISCUSSION/SUMMARY
[de-identified] : At this time the patient is progressing well. Patient demonstrates improvements in both range of motion and strength. Took time to discuss with patient the importance of maintaining ROM and Strength through daily HEP. Patient expressed understanding of this and will continue HEP. At this time the patient can continue will continue with physical therapy. Discussed importance of patient continuing the exercises on their own as well.  Patient will f/u in 6-8 weeks    The following information has been documented by Shahla Ruelas acting as a scribe. Dr. Ramirez Attestation The documentation recorded by the scribe, in my presence, accurately reflects the service I, Dr. Ramirez, personally performed, and the decisions made by me with my edits as appropriate.

## 2024-09-09 NOTE — HISTORY OF PRESENT ILLNESS
[de-identified] : Patient is here for a follow up on right knee. Patient is s/p right knee scope partial lateral meniscectomy, PF joint chondroplasty on 08/02/24. Patient reports he still has pain when going up or down inclines. Patient is doing HEP.  Patient states overall the knee is doing much better. Patient had injured left knee and it is subsequently casually related to the right knee.

## 2024-09-09 NOTE — PHYSICAL EXAM
[de-identified] : Constitutional: The patient appears well developed, well nourished.       Neurologic: Coordination is normal. Alert and oriented to time, place and person. No evidence of mood disorder, calm affect.        RIGHT     KNEE: Inspection of the knee is as follows: moderate effusion and NO ecchymosis. no streaking, no erythema, no atrophy, no deformities of the quad tendon and no deformities of patellar tendon.       Inspection of the wound reveals WOUNDS WELL HEALED      Palpation of the knee is as follows: no increased warmth.      Knee Range of Motion is as follows in degrees:        Extension: 0   Flexion: 115       Strength examination of the knee is as follows:    Quadriceps strength is 5/5    Hamstring strength is 5/5       Ligament Stability and Special Test ligamentously stable and no varus or valgus instability. patella tracks well and able to do active straight leg raise without an extensor lag.       Neurological examination of the knee is as follows: light touch is intact throughout.

## 2024-09-30 ENCOUNTER — APPOINTMENT (OUTPATIENT)
Dept: ORTHOPEDIC SURGERY | Facility: CLINIC | Age: 59
End: 2024-09-30

## 2024-09-30 DIAGNOSIS — M17.12 UNILATERAL PRIMARY OSTEOARTHRITIS, LEFT KNEE: ICD-10-CM

## 2024-09-30 DIAGNOSIS — S83.242A OTHER TEAR OF MEDIAL MENISCUS, CURRENT INJURY, LEFT KNEE, INITIAL ENCOUNTER: ICD-10-CM

## 2024-09-30 DIAGNOSIS — Z47.89 ENCOUNTER FOR OTHER ORTHOPEDIC AFTERCARE: ICD-10-CM

## 2024-09-30 PROCEDURE — 73562 X-RAY EXAM OF KNEE 3: CPT | Mod: LT

## 2024-09-30 PROCEDURE — 99213 OFFICE O/P EST LOW 20 MIN: CPT

## 2024-09-30 NOTE — HISTORY OF PRESENT ILLNESS
[de-identified] : 09/30/2024: f/u for left knee. doing HEP 1-2x a week. Symptoms are improving, no pain expect for going up the stairs. he has ongoing issues with his right knee and seeing dr ramirez.   08/19/2024: f/u for left knee. PT 2x a week. he recently had revision arthroscopy on his RT knee with Dr. Ramirez. left knee pain is gradually worsnening. ibuprofen PRN..  05/17/2024: f/u for left knee. PT 2x a week with improvement. still has clicking and pain when moving from sitting to standing. ibuprofen PRN. reports worsening back pain and has upcoming visit with a specialist.   Eval is 4/19 Date of surgery was 04/03/24  Surgery performed was LT knee medial meniscectomy Current pain score is 5/10 Current medications taken are Motrin, Ibuprofen  Physical therapy is not started yet

## 2024-09-30 NOTE — IMAGING
[de-identified] : (Exam: Knee)  Laterality is riht left   Patient is in no acute distress, alert and oriented Sensation is grossly intact to light touch in the foot Motor function is 5/5 in the foot Capillary refill is less than 2 seconds in the toes Lymphadenopathy is not present Peripheral edema is not present   Skin is intact Incisions are healed Effusion is not present Quadriceps atrophy is mild Calf tenderness is not present Joint line tenderness is not present   Knee extension is 0 Knee flexion is 120   Quadriceps strength is 5-/5 Hamstring strength is 5/5   Lachman is normal Posterior drawer is normal Varus stress stability is normal Valgus stress stability is normal [Left] : left knee [Mild tricompartmental OA medial narrowing] : Mild tricompartmental OA medial narrowing

## 2024-09-30 NOTE — DISCUSSION/SUMMARY
[de-identified] : (Impression) Patient is making expected post-operative progress. Pain in the left knee is improved.   (Plan)  -Continue HEP.  -Ibuprofen and acetaminophen as needed for pain. -Injection is deferred due to clinical improvement. -Follow up as needed.

## 2024-10-02 ENCOUNTER — APPOINTMENT (OUTPATIENT)
Dept: ORTHOPEDIC SURGERY | Facility: CLINIC | Age: 59
End: 2024-10-02

## 2024-10-02 DIAGNOSIS — M51.379 OTH INTVRT DISC DEGEN, LUMBOSACR W/O LUM BCK OR LW EXTRM PN: ICD-10-CM

## 2024-10-02 DIAGNOSIS — M48.061 SPINAL STENOSIS, LUMBAR REGION WITHOUT NEUROGENIC CLAUDICATION: ICD-10-CM

## 2024-10-02 DIAGNOSIS — M51.369: ICD-10-CM

## 2024-10-02 DIAGNOSIS — M47.817 SPONDYLOSIS W/OUT MYELOPATHY OR RADICULOPATHY, LUMBOSACRAL REGION: ICD-10-CM

## 2024-10-02 DIAGNOSIS — M48.10 ANKYLOSING HYPEROSTOSIS [FORESTIER], SITE UNSPECIFIED: ICD-10-CM

## 2024-10-02 DIAGNOSIS — Z00.00 ENCOUNTER FOR GENERAL ADULT MEDICAL EXAMINATION W/OUT ABNORMAL FINDINGS: ICD-10-CM

## 2024-10-02 PROCEDURE — 99214 OFFICE O/P EST MOD 30 MIN: CPT

## 2024-10-02 PROCEDURE — 72070 X-RAY EXAM THORAC SPINE 2VWS: CPT

## 2024-10-02 NOTE — DATA REVIEWED
[MRI] : MRI [Lumbar Spine] : lumbar spine [I independently reviewed and interpreted images and report] : I independently reviewed and interpreted images and report [FreeTextEntry1] : 8/2024 MRI of L-Spine was reviewed today and is as follows:  Bilateral facet arthropathy L3-4 Bilateral foraminal and right lateral recess stenosis L4-5 Moderate central stenosis L4-5 L>R facet arthropathy L5-S1

## 2024-10-02 NOTE — ASSESSMENT
[FreeTextEntry1] : 8/2024 MRI of L-Spine was reviewed today and is as follows:  Bilateral facet arthropathy L3-4 Bilateral foraminal and right lateral recess stenosis L4-5 Moderate central stenosis L4-5 L>R facet arthropathy L5-S1  58 yo male presents today for eval of his low back. MRI of low back detailed above reveals stenosis and arthritis of multiple areas. Patient would likely benefit from ALISHA for relief. However, patient also with findings on XR consistent with DISH. I discussed with patient that MRI is indicated given XR findings and persistent pain with radiculopathy.  - Patient given prescription for MRI, follow up after study is completed to discuss results.   - Referral to pain management for lumbar ALISHA, follow up 2 weeks after injection.   - Recommend physical therapy to regain range of motion, strengthening and symptomatic improvement. Prescription given in office today.    - Recommend NSAIDs PRN - Recommend heating pad use to decrease muscle spasm - Discussed the importance of home exercises, including but not limited to hamstring stretching and core strengthening   Patient was educated on their diagnosis today. All questions answered and patient expressed understanding.  F/U after MRI

## 2024-10-02 NOTE — PHYSICAL EXAM
[de-identified] : Constitutional: Well groomed and developed.  Respiratory: Normal, unlabored breathing. No use of accessory muscles.  Skin: No rashes or ulcers. Skin warm and dry.  Psychiatric: Oriented to time, place, person and event. No acute distress. Gait: Heel to toe Patient able to walk on toes and heels.    Lumbar spine:  Posture: Normal on coronal and sagittal alignment  AROM:  Flexion 60  Extension 5 Moderate pain with simulated truncal motion   Tenderness:  Thoracic: Moderate tenderness on palpation     TTP T9 Lumbar: Moderate tenderness on palpation  Sacrum/coccyx: no tenderness on palpation  Greater trochanteric bursa:  No tenderness  PSIS: None    Motor:                         R             L LE:                                IS                    5             5 Quad              5             5 TA                  5             5 EHL                5             5 Gastroc         5             5                 R  L DTR: Patella  2+  2+ Achilles  2+  2+  Sensory: Light touch sensation decreased right L4 Babinski's Sign: Negative bilaterally  Straight leg raise test: Positive bilaterally  REGGIE test: negative bilaterally

## 2024-10-02 NOTE — HISTORY OF PRESENT ILLNESS
[de-identified] : Body part: lower back Better/ Worse/ Same since last visit: worse Treatments since last visit: lumbar spine MRI at [facility], PT 2x a week  Difficulty with: lying flat on his back, walking Radicular symptoms: none Paresthesia: none Current medications for pain: Robaxin PRN with minimal relief   Today's Pain:  5-6/10

## 2024-10-14 ENCOUNTER — RESULT REVIEW (OUTPATIENT)
Age: 59
End: 2024-10-14

## 2024-10-30 ENCOUNTER — APPOINTMENT (OUTPATIENT)
Dept: ORTHOPEDIC SURGERY | Facility: CLINIC | Age: 59
End: 2024-10-30
Payer: OTHER MISCELLANEOUS

## 2024-10-30 VITALS — BODY MASS INDEX: 43.96 KG/M2 | HEIGHT: 71 IN | WEIGHT: 314 LBS

## 2024-10-30 DIAGNOSIS — M17.11 UNILATERAL PRIMARY OSTEOARTHRITIS, RIGHT KNEE: ICD-10-CM

## 2024-10-30 PROCEDURE — 99214 OFFICE O/P EST MOD 30 MIN: CPT | Mod: 25

## 2024-10-30 PROCEDURE — 20610 DRAIN/INJ JOINT/BURSA W/O US: CPT | Mod: RT

## 2024-10-31 ENCOUNTER — APPOINTMENT (OUTPATIENT)
Dept: ORTHOPEDIC SURGERY | Facility: CLINIC | Age: 59
End: 2024-10-31

## 2024-10-31 DIAGNOSIS — M48.10 ANKYLOSING HYPEROSTOSIS [FORESTIER], SITE UNSPECIFIED: ICD-10-CM

## 2024-10-31 DIAGNOSIS — M47.817 SPONDYLOSIS W/OUT MYELOPATHY OR RADICULOPATHY, LUMBOSACRAL REGION: ICD-10-CM

## 2024-10-31 DIAGNOSIS — M48.061 SPINAL STENOSIS, LUMBAR REGION WITHOUT NEUROGENIC CLAUDICATION: ICD-10-CM

## 2024-10-31 DIAGNOSIS — M51.379 OTH INTVRT DISC DEGEN, LUMBOSACR W/O LUM BCK OR LW EXTRM PN: ICD-10-CM

## 2024-10-31 DIAGNOSIS — M43.16 SPONDYLOLISTHESIS, LUMBAR REGION: ICD-10-CM

## 2024-10-31 DIAGNOSIS — M48.04 SPINAL STENOSIS, THORACIC REGION: ICD-10-CM

## 2024-10-31 DIAGNOSIS — M51.369: ICD-10-CM

## 2024-10-31 PROCEDURE — 99214 OFFICE O/P EST MOD 30 MIN: CPT | Mod: 24

## 2024-11-30 ENCOUNTER — NON-APPOINTMENT (OUTPATIENT)
Age: 59
End: 2024-11-30

## 2024-12-04 ENCOUNTER — APPOINTMENT (OUTPATIENT)
Dept: ORTHOPEDIC SURGERY | Facility: CLINIC | Age: 59
End: 2024-12-04

## 2024-12-11 ENCOUNTER — APPOINTMENT (OUTPATIENT)
Dept: ORTHOPEDIC SURGERY | Facility: CLINIC | Age: 59
End: 2024-12-11
Payer: OTHER MISCELLANEOUS

## 2024-12-11 DIAGNOSIS — M17.11 UNILATERAL PRIMARY OSTEOARTHRITIS, RIGHT KNEE: ICD-10-CM

## 2024-12-11 PROCEDURE — 99214 OFFICE O/P EST MOD 30 MIN: CPT

## 2024-12-11 RX ORDER — METHYLPREDNISOLONE 4 MG/1
4 TABLET ORAL
Qty: 1 | Refills: 0 | Status: ACTIVE | COMMUNITY
Start: 2024-12-11 | End: 1900-01-01

## 2025-01-09 ENCOUNTER — APPOINTMENT (OUTPATIENT)
Dept: ORTHOPEDIC SURGERY | Facility: CLINIC | Age: 60
End: 2025-01-09

## 2025-01-15 ENCOUNTER — APPOINTMENT (OUTPATIENT)
Dept: ORTHOPEDIC SURGERY | Facility: CLINIC | Age: 60
End: 2025-01-15
Payer: OTHER MISCELLANEOUS

## 2025-01-15 DIAGNOSIS — M17.11 UNILATERAL PRIMARY OSTEOARTHRITIS, RIGHT KNEE: ICD-10-CM

## 2025-01-15 PROCEDURE — 99213 OFFICE O/P EST LOW 20 MIN: CPT

## 2025-01-15 PROCEDURE — 73562 X-RAY EXAM OF KNEE 3: CPT | Mod: RT

## 2025-01-31 ENCOUNTER — NON-APPOINTMENT (OUTPATIENT)
Age: 60
End: 2025-01-31

## 2025-02-14 ENCOUNTER — NON-APPOINTMENT (OUTPATIENT)
Age: 60
End: 2025-02-14

## 2025-02-14 ENCOUNTER — APPOINTMENT (OUTPATIENT)
Dept: ORTHOPEDIC SURGERY | Facility: CLINIC | Age: 60
End: 2025-02-14
Payer: MEDICAID

## 2025-02-14 DIAGNOSIS — Z47.89 ENCOUNTER FOR OTHER ORTHOPEDIC AFTERCARE: ICD-10-CM

## 2025-02-14 DIAGNOSIS — S83.242A OTHER TEAR OF MEDIAL MENISCUS, CURRENT INJURY, LEFT KNEE, INITIAL ENCOUNTER: ICD-10-CM

## 2025-02-14 DIAGNOSIS — M17.12 UNILATERAL PRIMARY OSTEOARTHRITIS, LEFT KNEE: ICD-10-CM

## 2025-02-14 PROCEDURE — 99213 OFFICE O/P EST LOW 20 MIN: CPT

## 2025-05-07 ENCOUNTER — APPOINTMENT (OUTPATIENT)
Dept: ORTHOPEDIC SURGERY | Facility: CLINIC | Age: 60
End: 2025-05-07
Payer: OTHER MISCELLANEOUS

## 2025-05-07 DIAGNOSIS — M17.11 UNILATERAL PRIMARY OSTEOARTHRITIS, RIGHT KNEE: ICD-10-CM

## 2025-05-07 PROCEDURE — 99213 OFFICE O/P EST LOW 20 MIN: CPT

## 2025-07-30 ENCOUNTER — APPOINTMENT (OUTPATIENT)
Dept: ORTHOPEDIC SURGERY | Facility: CLINIC | Age: 60
End: 2025-07-30
Payer: OTHER MISCELLANEOUS

## 2025-07-30 DIAGNOSIS — Z98.890 OTHER SPECIFIED POSTPROCEDURAL STATES: ICD-10-CM

## 2025-07-30 PROCEDURE — 20610 DRAIN/INJ JOINT/BURSA W/O US: CPT | Mod: RT

## 2025-07-30 PROCEDURE — 99214 OFFICE O/P EST MOD 30 MIN: CPT | Mod: 25

## 2025-08-18 ENCOUNTER — APPOINTMENT (OUTPATIENT)
Dept: ORTHOPEDIC SURGERY | Facility: CLINIC | Age: 60
End: 2025-08-18
Payer: OTHER MISCELLANEOUS

## 2025-08-18 DIAGNOSIS — M17.11 UNILATERAL PRIMARY OSTEOARTHRITIS, RIGHT KNEE: ICD-10-CM

## 2025-08-18 PROCEDURE — 99213 OFFICE O/P EST LOW 20 MIN: CPT
